# Patient Record
Sex: FEMALE
[De-identification: names, ages, dates, MRNs, and addresses within clinical notes are randomized per-mention and may not be internally consistent; named-entity substitution may affect disease eponyms.]

---

## 2018-11-08 ENCOUNTER — LAB SERVICES (OUTPATIENT)
Dept: OTHER | Age: 33
End: 2018-11-08

## 2018-11-08 ENCOUNTER — CHARTING TRANS (OUTPATIENT)
Dept: OTHER | Age: 33
End: 2018-11-08

## 2018-11-08 LAB — RAPID STREP GROUP A: NORMAL

## 2018-12-08 VITALS
DIASTOLIC BLOOD PRESSURE: 72 MMHG | TEMPERATURE: 98.6 F | BODY MASS INDEX: 19.77 KG/M2 | HEART RATE: 83 BPM | SYSTOLIC BLOOD PRESSURE: 110 MMHG | OXYGEN SATURATION: 99 % | WEIGHT: 123.02 LBS | HEIGHT: 66 IN

## 2024-03-13 ENCOUNTER — HOSPITAL ENCOUNTER (OUTPATIENT)
Age: 39
Discharge: HOME | End: 2024-03-13
Payer: COMMERCIAL

## 2024-03-13 VITALS
TEMPERATURE: 99 F | SYSTOLIC BLOOD PRESSURE: 148 MMHG | HEART RATE: 86 BPM | RESPIRATION RATE: 20 BRPM | OXYGEN SATURATION: 99 % | DIASTOLIC BLOOD PRESSURE: 71 MMHG

## 2024-03-13 DIAGNOSIS — H92.03 EAR PAIN, BILATERAL: Primary | ICD-10-CM

## 2024-03-13 NOTE — ED INITIAL ASSESSMENT (HPI)
Pt here with complaints of bilateral ear pain  that began today pt states she had ear buds on at the gym today because the music was to loud and states she has some ringing in her ears now, pt denies any fever

## 2024-03-14 ENCOUNTER — OFFICE VISIT (OUTPATIENT)
Dept: OTOLARYNGOLOGY | Facility: CLINIC | Age: 39
End: 2024-03-14

## 2024-03-14 VITALS — HEIGHT: 66 IN | BODY MASS INDEX: 19.29 KG/M2 | WEIGHT: 120 LBS

## 2024-03-14 DIAGNOSIS — H93.11 TINNITUS OF RIGHT EAR: Primary | ICD-10-CM

## 2024-03-14 DIAGNOSIS — H91.93 BILATERAL HEARING LOSS, UNSPECIFIED HEARING LOSS TYPE: ICD-10-CM

## 2024-03-14 DIAGNOSIS — H69.93 DYSFUNCTION OF BOTH EUSTACHIAN TUBES: ICD-10-CM

## 2024-03-14 PROCEDURE — 92504 EAR MICROSCOPY EXAMINATION: CPT | Performed by: STUDENT IN AN ORGANIZED HEALTH CARE EDUCATION/TRAINING PROGRAM

## 2024-03-14 PROCEDURE — 99203 OFFICE O/P NEW LOW 30 MIN: CPT | Performed by: STUDENT IN AN ORGANIZED HEALTH CARE EDUCATION/TRAINING PROGRAM

## 2024-03-14 PROCEDURE — 3008F BODY MASS INDEX DOCD: CPT | Performed by: STUDENT IN AN ORGANIZED HEALTH CARE EDUCATION/TRAINING PROGRAM

## 2024-03-14 RX ORDER — FLUTICASONE PROPIONATE 50 MCG
1 SPRAY, SUSPENSION (ML) NASAL 2 TIMES DAILY
Qty: 16 G | Refills: 3 | Status: SHIPPED | OUTPATIENT
Start: 2024-03-14

## 2024-03-14 RX ORDER — ADAPALENE GEL USP, 0.3% 3 MG/G
1 GEL TOPICAL NIGHTLY
COMMUNITY
Start: 2024-03-03

## 2024-03-14 RX ORDER — PREDNISONE 20 MG/1
20 TABLET ORAL DAILY
Qty: 7 TABLET | Refills: 0 | Status: SHIPPED | OUTPATIENT
Start: 2024-03-14 | End: 2024-03-21

## 2024-03-14 RX ORDER — CLINDAMYCIN AND BENZOYL PEROXIDE 10; 50 MG/G; MG/G
GEL TOPICAL
COMMUNITY
Start: 2023-10-03

## 2024-03-14 NOTE — ED PROVIDER NOTES
Patient Seen in: [facility]      History     Chief Complaint   Patient presents with    Ear Problem Pain     Stated Complaint: EAR ACHE    Subjective:   HPI  Patient is a 38-year-old female who presents to the immediate care center with concern for bilateral ear pain and tinnitus.  This evening she went to a workout facility where she found they were playing excessively loud music.  This caused her distress and prompted her to place earplugs.  She estimates she was exposed to the extreme loud noise for very short duration.  After working out, she noticed she had muffled, ringing sensation in her ears.  She denies headache or dizziness; denies recent illness.          Objective:   History reviewed. No pertinent past medical history.           History reviewed. No pertinent surgical history.             Social History     Socioeconomic History    Marital status:    Tobacco Use    Smoking status: Never    Smokeless tobacco: Never   Vaping Use    Vaping Use: Never used   Substance and Sexual Activity    Alcohol use: Never    Drug use: Never              Review of Systems   Constitutional:  Negative for chills and fever.   HENT:  Positive for ear pain and tinnitus.    Skin:  Negative for rash.   Neurological:  Negative for dizziness, weakness and headaches.       Positive for stated complaint: EAR ACHE  Other systems are as noted in HPI.  Constitutional and vital signs reviewed.      All other systems reviewed and negative except as noted above.    Physical Exam     ED Triage Vitals [03/13/24 1834]   /71   Pulse 86   Resp 20   Temp 98.9 °F (37.2 °C)   Temp src Temporal   SpO2 99 %   O2 Device None (Room air)       Current:/71   Pulse 86   Temp 98.9 °F (37.2 °C) (Temporal)   Resp 20   LMP 02/14/2024   SpO2 99%         Physical Exam  Vitals and nursing note reviewed.   Constitutional:       General: She is not in acute distress.  HENT:      Right Ear: Tympanic membrane, ear canal and  external ear normal.      Left Ear: Tympanic membrane, ear canal and external ear normal.      Nose: Nose normal.   Eyes:      Conjunctiva/sclera: Conjunctivae normal.   Pulmonary:      Effort: Pulmonary effort is normal. No respiratory distress.   Musculoskeletal:      Cervical back: Normal range of motion and neck supple.   Skin:     General: Skin is warm and dry.   Neurological:      Mental Status: She is alert and oriented to person, place, and time.   Psychiatric:         Behavior: Behavior normal.               ED Course   Labs Reviewed - No data to display                   MDM      Patient was reassured by negative findings on her physical examination today.  She was advised that structurally her ears show no signs of infection or injury.  As she does have some continued ringing in her ear, she was advised to follow-up with ENT specialist as she may need audiology testing or further evaluation.  She states understanding agrees with plan.                                 Medical Decision Making  Differential diagnoses considered include, but are not exclusive of: Acute otitis media, suppurative; acute otitis externa; acute otitis media, serous; ruptured tympanic membrane; mastoiditis.      Problems Addressed:  Ear pain, bilateral: undiagnosed new problem with uncertain prognosis    Risk  OTC drugs.        Disposition and Plan     Clinical Impression:  1. Ear pain, bilateral         Disposition:  Discharge  3/13/2024  7:28 pm    Follow-up:  Satya Frye DO  [address] [address]  [phone]    Schedule an appointment as soon as possible for a visit in 1 week      [facility] Emergency Department  [address] [facility] [address]  [phone]  Go to   If symptoms worsen          Medications Prescribed:  There are no discharge medications for this patient.

## 2024-03-14 NOTE — PROGRESS NOTES
[place]  OTOLARYNGOLOGY - HEAD & NECK SURGERY    3/14/2024     Reason for Consultation:   Bilateral ear problem    History of Present Illness:   Patient is a pleasant 38 year old female who is being seen for an ear issue which she describes as pressure behind her ear, popping and crackling, and heightened sensitivity to noise.  She noticed this first happening after a workout class yesterday where the music was being played very loudly.  She did wear inserts in her ear but she stated afterwards she felt as though she had discomfort about noise.  She was concerned that she may have developed hearing loss due to loud noise exposure due to this.  She is here for further evaluation as she continues to have this popping and crackling sensation and pressure as well as tinnitus which is mostly in the right ear.    Past Medical History  History reviewed. No pertinent past medical history.    Past Surgical History  History reviewed. No pertinent surgical history.    Family History  History reviewed. No pertinent family history.    Social History  Pediatric History   Patient Parents    Not on file     Other Topics Concern    Not on file   Social History Narrative    Not on file           Current Medications:  Current Outpatient Medications   Medication Sig Dispense Refill    Adapalene 0.3 % External Gel Apply 1 Application topically nightly. (Patient not taking: Reported on 3/14/2024)      Clindamycin Phos-Benzoyl Perox 1-5 % External Gel APPLY THIN LAYER TOPICALLY TO FACE EVERY DAY (Patient not taking: Reported on 3/14/2024)         Allergies  Allergies   Allergen Reactions    Nuts OTHER (SEE COMMENTS) and ITCHING     Cerner Allergy Text Annotation: Nuts       Review of Systems:   A comprehensive 10 point review of systems was completed.  Pertinent positives and negatives noted in the the HPI.    Physical Exam:   Height 5' 6\" (1.676 m), weight 120 lb (54.4 kg), last menstrual period 02/14/2024.    GENERAL: No acute  distress, Comfortable appearing  FACE: HB 1/6, Normal Animation  HEAD: Normocephalic  EYES: EOMI, pupils equil  EARS: Bilateral Auricles Symmetric  NOSE: Nares patent bilaterally  ORAL CAVITY: Tongue mobile, Oropharynx clear, Floor of mouth clear, Posterior oropharynx normal  NECK: No palpable lymphadenopathy, thyroid not palpable, nontender    Canals:  Right: Clear  Left: Clear    Tympanic Membranes:  Right: Normal tympanic membrane, with no retraction, middle ear space clear  Left: Normal tympanic membrane. with no retraction middle ear space clear    TM Visualized Method:   Right TM examined via otomicroscopy.   Left TM examined via otomicroscopy.        Results:     Laboratory Data:  No results found for: \"WBC\", \"HGB\", \"HCT\", \"PLT\", \"CREATSERUM\", \"BUN\", \"NA\", \"K\", \"CL\", \"CO2\", \"GLU\", \"CA\", \"ALB\", \"ALKPHO\", \"TP\", \"AST\", \"ALT\", \"PTT\", \"INR\", \"PTP\", \"T4F\", \"TSH\", \"TSHREFLEX\", \"MARIBETH\", \"LIP\", \"GGT\", \"PSA\", \"DDIMER\", \"ESRML\", \"ESRPF\", \"CRP\", \"BNP\", \"MG\", \"PHOS\", \"TROP\", \"CK\", \"CKMB\", \"MEL\", \"RPR\", \"B12\", \"ETOH\", \"POCGLU\"      Imaging:  No results found.      Impression:   Bilateral eustachian tube dysfunction  Bilateral hyperacusis  Bilateral hearing loss, unspecified  Tinnitus    Recommendations:  I will give her 1 week of 20mg Prednisone  She will use Flonase twice daily for 2 weeks  I will have her see me back in 4 weeks at [facility] to obtain audiogram if she has persistent symptoms.  She may also have a component of TMJ    Thank you for allowing me to participate in the care of your patient.    Satya Frye,    Otolaryngology/Rhinology, Sinus, and Endoscopic Skull Base Surgery  [address] [place] [address]  Phone [phone]  Fax [phone]  3/14/2024  11:30 AM  3/14/2024

## 2024-03-16 ENCOUNTER — PATIENT MESSAGE (OUTPATIENT)
Dept: OTOLARYNGOLOGY | Facility: CLINIC | Age: 39
End: 2024-03-16

## 2024-03-18 ENCOUNTER — TELEPHONE (OUTPATIENT)
Dept: OTOLARYNGOLOGY | Facility: CLINIC | Age: 39
End: 2024-03-18

## 2024-03-18 DIAGNOSIS — H69.93 DYSFUNCTION OF BOTH EUSTACHIAN TUBES: ICD-10-CM

## 2024-03-18 DIAGNOSIS — H93.11 TINNITUS OF RIGHT EAR: Primary | ICD-10-CM

## 2024-03-18 DIAGNOSIS — H91.93 BILATERAL HEARING LOSS, UNSPECIFIED HEARING LOSS TYPE: ICD-10-CM

## 2024-03-18 RX ORDER — MELOXICAM 15 MG/1
15 TABLET ORAL NIGHTLY
Qty: 30 TABLET | Refills: 3 | Status: SHIPPED | OUTPATIENT
Start: 2024-03-18

## 2024-03-18 NOTE — TELEPHONE ENCOUNTER
Spoke to the patient, I will send her Meloxicam and she states she made an appointment to see Dr. Potter tomorrow and will likely get audiogram. I told her that is a good idea and she will be evaluated by him tomorrow.

## 2024-03-18 NOTE — TELEPHONE ENCOUNTER
Pt message 3-16-24.  Pt called stating pt had an appointment on 3-14-24.  Some symptoms are getting better.  Still has pain in the right ear. Question.  Is it ok to travel by airplane on Wednesday 3-20-24.   Please call pt

## 2024-03-18 NOTE — TELEPHONE ENCOUNTER
Per dr. Uday md Spoke to the patient, md will send her Meloxicam and she states she made an appointment to see Dr. Potter tomorrow and will likely get audiogram. md told her that is a good idea and she will be evaluated by him tomorrow.   Future Appointments   Date Time Provider Department Center   3/19/2024  8:10 AM Darren Potter MD [facility]   4/15/2024  2:15 PM Satya Frye DO [facility]

## 2024-03-18 NOTE — TELEPHONE ENCOUNTER
It is okay to fly as I did not see any fluid in the middle ears bilaterally. I would just continue using Flonase through the trip to help prevent any issues!    Best,     MH

## 2024-03-18 NOTE — TELEPHONE ENCOUNTER
Contacted patient and per Dr. Frye, It is okay to fly as MD did not see any fluid in the middle ears bilaterally. Patient should just continue using Flonase through the trip to help prevent any issues!    Patient now states that yesterday, she felt like there was fluid in her right ear all day but it cleared last night, right ear feels damp but no drainage noted, and sounds to right ear sound amplified. Would like further advice concerning these issues.    Dr. Frye, please see message and advise.

## 2024-03-19 ENCOUNTER — OFFICE VISIT (OUTPATIENT)
Dept: OTOLARYNGOLOGY | Facility: CLINIC | Age: 39
End: 2024-03-19
Payer: COMMERCIAL

## 2024-03-19 DIAGNOSIS — M26.609 TMJ (TEMPOROMANDIBULAR JOINT DISORDER): ICD-10-CM

## 2024-03-19 DIAGNOSIS — H93.8X1 SENSATION OF FULLNESS IN RIGHT EAR: Primary | ICD-10-CM

## 2024-03-19 PROCEDURE — 92504 EAR MICROSCOPY EXAMINATION: CPT | Performed by: STUDENT IN AN ORGANIZED HEALTH CARE EDUCATION/TRAINING PROGRAM

## 2024-03-19 PROCEDURE — 99213 OFFICE O/P EST LOW 20 MIN: CPT | Performed by: STUDENT IN AN ORGANIZED HEALTH CARE EDUCATION/TRAINING PROGRAM

## 2024-03-19 PROCEDURE — 92567 TYMPANOMETRY: CPT | Performed by: STUDENT IN AN ORGANIZED HEALTH CARE EDUCATION/TRAINING PROGRAM

## 2024-03-19 RX ORDER — MELOXICAM 15 MG/1
15 TABLET ORAL NIGHTLY
Qty: 14 TABLET | Refills: 0 | Status: SHIPPED | OUTPATIENT
Start: 2024-03-19 | End: 2024-04-02

## 2024-03-19 NOTE — PROGRESS NOTES
Elizabeth Marklein is a 38 year old female.   Chief Complaint   Patient presents with    Ear Problem     C/o right ear sensitivity, pt seen dr. Frye and is traveling        ASSESSMENT AND PLAN:   1. Sensation of fullness in right ear  38-year-old presents in follow-up regarding sensitivity of her right ear.  She is going to be flying to [place] tomorrow.  She does have a recent history of dental work In need of implants.  She does possibly grind her teeth.    Exam she has scalloping of her tongue and missing dentition.  She had a normal ear exam and type a tympanograms on each side    Reassured her of the normal ear exam and very normal tympanograms with no signs of eustachian tube dysfunction.  Discussed that given her dental history and scalloping of her tongue she may have a flare of a TMJ issue.  Discussed that sometimes these can flareup with flying due to clenching during the flight.  Will prescribe a short course of meloxicam and discussed TMJ precautions.    - Audiology Exam    2. TMJ (temporomandibular joint disorder)        The patient indicates understanding of these issues and agrees to the plan.      EXAM:   [facility] 02/14/2024     Pertinent exam findings may also be noted above in assessment and plan     System Details   Skin Inspection - Normal.   Constitutional Overall appearance - Normal.   Head/Face Symmetric, TMJ tenderness not present    Eyes EOMI, PERRL   Right ear:  Canal clear, TM intact, no NICOLE   Left ear:  Canal clear, TM intact, no NICOLE   Nose: Septum midline, inferior turbinates not enlarged, nasal valves without collapse    Oral cavity/Oropharynx: No lesions or masses on inspection or palpation, tonsils symmetric    Neck: Soft without LAD, thyroid not enlarged  Voice clear/ no stridor   Other:      Scopes and Procedures:             Current Outpatient Medications   Medication Sig Dispense Refill    Meloxicam 15 MG Oral Tab Take 1 tablet (15 mg total) by mouth at bedtime. 30 tablet 3     predniSONE 20 MG Oral Tab Take 1 tablet (20 mg total) by mouth daily for 7 days. 7 tablet 0    fluticasone propionate 50 MCG/ACT Nasal Suspension 1 spray by Nasal route 2 (two) times daily. 16 g 3    Adapalene 0.3 % External Gel Apply 1 Application topically nightly. (Patient not taking: Reported on 3/14/2024)      Clindamycin Phos-Benzoyl Perox 1-5 % External Gel APPLY THIN LAYER TOPICALLY TO FACE EVERY DAY (Patient not taking: Reported on 3/14/2024)        History reviewed. No pertinent past medical history.   Social History:  Social History     Socioeconomic History    Marital status:    Tobacco Use    Smoking status: Never    Smokeless tobacco: Never   Vaping Use    Vaping Use: Never used   Substance and Sexual Activity    Alcohol use: Never    Drug use: Never          Darren Potter MD  3/19/2024  8:49 AM

## 2024-03-20 NOTE — TELEPHONE ENCOUNTER
From: Elizabeth Marklein  To: Satya Frye  Sent: 3/16/2024 7:05 AM CDT  Subject: Travel by airplane    Hi Dr. Frye,  I forgot to mention that I am supposed to travel with my kids to [place] on Wednesday. Is this something that I can do with Eustachian tube dysfunction? My left ear is feeling better but my right ear continues to have some pain/crackling. Is there anything I should do preventatively before flying?    Thank you  Elizabeth Marklein

## 2024-04-15 ENCOUNTER — OFFICE VISIT (OUTPATIENT)
Dept: OTOLARYNGOLOGY | Facility: CLINIC | Age: 39
End: 2024-04-15
Payer: COMMERCIAL

## 2024-04-15 VITALS — HEIGHT: 65 IN | BODY MASS INDEX: 19.99 KG/M2 | WEIGHT: 120 LBS

## 2024-04-15 DIAGNOSIS — H69.93 DYSFUNCTION OF BOTH EUSTACHIAN TUBES: ICD-10-CM

## 2024-04-15 DIAGNOSIS — H93.11 TINNITUS OF RIGHT EAR: ICD-10-CM

## 2024-04-15 DIAGNOSIS — M26.609 TMJ (TEMPOROMANDIBULAR JOINT DISORDER): ICD-10-CM

## 2024-04-15 DIAGNOSIS — H93.8X1 SENSATION OF FULLNESS IN RIGHT EAR: Primary | ICD-10-CM

## 2024-04-15 PROCEDURE — 3008F BODY MASS INDEX DOCD: CPT | Performed by: STUDENT IN AN ORGANIZED HEALTH CARE EDUCATION/TRAINING PROGRAM

## 2024-04-15 PROCEDURE — 31231 NASAL ENDOSCOPY DX: CPT | Performed by: STUDENT IN AN ORGANIZED HEALTH CARE EDUCATION/TRAINING PROGRAM

## 2024-04-15 PROCEDURE — 99213 OFFICE O/P EST LOW 20 MIN: CPT | Performed by: STUDENT IN AN ORGANIZED HEALTH CARE EDUCATION/TRAINING PROGRAM

## 2024-04-15 RX ORDER — LORAZEPAM 1 MG/1
1 TABLET ORAL 2 TIMES DAILY PRN
COMMUNITY
Start: 2024-03-14

## 2024-04-15 NOTE — PROGRESS NOTES
[facility]  OTOLARYNGOLOGY - HEAD & NECK SURGERY    4/15/2024     Reason for Consultation:   Bilateral ear problem    History of Present Illness:   Patient is a pleasant 38 year old female who is being seen for an ear issue which she describes as pressure behind her ear, popping and crackling, and heightened sensitivity to noise.  She noticed this first happening after a workout class yesterday where the music was being played very loudly.  She did wear inserts in her ear but she stated afterwards she felt as though she had discomfort about noise.  She was concerned that she may have developed hearing loss due to loud noise exposure due to this.  She is here for further evaluation as she continues to have this popping and crackling sensation and pressure as well as tinnitus which is mostly in the right ear.    INTERVAL HISTORY  4/15/2024: Patient does have some improvement. She is here to check her jaw and follow up on her symptoms    Past Medical History  History reviewed. No pertinent past medical history.    Past Surgical History  Past Surgical History:   Procedure Laterality Date    Other surgical history  02/14/2024    oral surgery       Family History  History reviewed. No pertinent family history.    Social History  Pediatric History   Patient Parents    Not on file     Other Topics Concern    Not on file   Social History Narrative    Not on file           Current Medications:  Current Outpatient Medications   Medication Sig Dispense Refill    LORazepam 1 MG Oral Tab Take 1 tablet (1 mg total) by mouth 2 (two) times daily as needed for Anxiety.      tretinoin 0.025 % External Cream Apply pea-sized amount to facial skin every other night to nightly, as tolerated      Meloxicam 15 MG Oral Tab Take 1 tablet (15 mg total) by mouth at bedtime. 30 tablet 3    fluticasone propionate 50 MCG/ACT Nasal Suspension 1 spray by Nasal route 2 (two) times daily. 16 g 3    Adapalene 0.3 % External Gel Apply 1 Application  topically nightly. (Patient not taking: Reported on 3/14/2024)      Clindamycin Phos-Benzoyl Perox 1-5 % External Gel APPLY THIN LAYER TOPICALLY TO FACE EVERY DAY (Patient not taking: Reported on 3/14/2024)         Allergies  Allergies   Allergen Reactions    Nuts OTHER (SEE COMMENTS) and ITCHING     Cerner Allergy Text Annotation: Nuts       Review of Systems:   A comprehensive 10 point review of systems was completed.  Pertinent positives and negatives noted in the the HPI.    Physical Exam:   Height 5' 5\" (1.651 m), weight 120 lb (54.4 kg), last menstrual period 02/14/2024.    GENERAL: No acute distress, Comfortable appearing  FACE: HB 1/6, Normal Animation  HEAD: Normocephalic  EYES: EOMI, pupils equil  EARS: Bilateral Auricles Symmetric  NOSE: Nares patent bilaterally  ORAL CAVITY: Tongue mobile, Oropharynx clear, Floor of mouth clear, Posterior oropharynx normal  NECK: No palpable lymphadenopathy, thyroid not palpable, nontender    Canals:  Right: Clear  Left: Clear    Tympanic Membranes:  Right: Normal tympanic membrane, with no retraction, middle ear space clear  Left: Normal tympanic membrane. with no retraction middle ear space clear    TM Visualized Method:   Right TM examined via otomicroscopy.   Left TM examined via otomicroscopy.      PROCEDURE: BILATERAL RIGID NASAL ENDOSCOPY  Bilateral rigid nasal endoscopy (06874) was performed. Verbal consent was obtained from the patient to proceed with rigid nasal endoscopy. The nasal cavity was decongested and topically anesthetized with a combination of Oxymetazoline and 4% Lidocaine. A rigid 4mm 30 degree nasal endoscope connected to a high-definition endoscopy system was used to examine both nasal cavities. Digital photos and/or videos of relevant exam findings were obtained. The inferior meatus, inferior turbinate, nasopharynx, middle meatus, middle turbinate, superior meatus, superior turbinate, and sphenoethmoidal recess were examined bilaterally and  deemed to be normal, with any exceptions as noted below. At the completion of the procedure the endoscope was removed. The patient tolerated the procedure well. There were no complications.    Findings: The bilateral inferior turbinates were normal. The Septum was deviated to the right with bony spur. The middle meatus was patent bilaterally. There were no obvious masses or polyps noted.        Results:     Laboratory Data:  No results found for: \"WBC\", \"HGB\", \"HCT\", \"PLT\", \"CREATSERUM\", \"BUN\", \"NA\", \"K\", \"CL\", \"CO2\", \"GLU\", \"CA\", \"ALB\", \"ALKPHO\", \"TP\", \"AST\", \"ALT\", \"PTT\", \"INR\", \"PTP\", \"T4F\", \"TSH\", \"TSHREFLEX\", \"MARIBETH\", \"LIP\", \"GGT\", \"PSA\", \"DDIMER\", \"ESRML\", \"ESRPF\", \"CRP\", \"BNP\", \"MG\", \"PHOS\", \"TROP\", \"CK\", \"CKMB\", \"MEL\", \"RPR\", \"B12\", \"ETOH\", \"POCGLU\"      Imaging:  No results found.      Impression:   Bilateral eustachian tube dysfunction  Bilateral hyperacusis  Bilateral hearing loss, unspecified  Tinnitus  Deviated nasal septum    Recommendations:  Patient is improving and no longer has loud tinnitus throughout the day. She still has popping and crackling in the ear. I would like to restart her on Flonase. We can consider a trial of sudafed as well. She will likely continue to improve    Thank you for allowing me to participate in the care of your patient.    Satya Frye,    Otolaryngology/Rhinology, Sinus, and Endoscopic Skull Base Surgery  [address] [place] [address]  Phone [phone]  Fax [phone]  3/14/2024  11:30 AM  4/15/2024

## 2024-12-27 ENCOUNTER — HOSPITAL ENCOUNTER (OUTPATIENT)
Age: 39
Discharge: HOME | End: 2024-12-27
Payer: COMMERCIAL

## 2024-12-27 VITALS
SYSTOLIC BLOOD PRESSURE: 106 MMHG | OXYGEN SATURATION: 100 % | RESPIRATION RATE: 20 BRPM | TEMPERATURE: 98 F | HEART RATE: 90 BPM | DIASTOLIC BLOOD PRESSURE: 85 MMHG

## 2024-12-27 DIAGNOSIS — J11.1 INFLUENZA: Primary | ICD-10-CM

## 2024-12-27 DIAGNOSIS — R05.9 COUGH: ICD-10-CM

## 2024-12-27 LAB
POCT INFLUENZA A: POSITIVE
POCT INFLUENZA B: NEGATIVE
S PYO AG THROAT QL: NEGATIVE

## 2024-12-27 RX ORDER — BENZONATATE 200 MG/1
200 CAPSULE ORAL 3 TIMES DAILY PRN
Qty: 15 CAPSULE | Refills: 0 | Status: SHIPPED | OUTPATIENT
Start: 2024-12-27

## 2024-12-27 NOTE — ED PROVIDER NOTES
Patient Seen in: [facility]      History     Chief Complaint   Patient presents with    Cough/URI     Stated Complaint: cold symptoms    Subjective:   HPI  Patient is an 39-year-old female that presents to the immediate care center today with concern for cough, congestion, sore throat, ear pain that started 4 days ago. Pt's daughter recently had similar symptoms.  Pt. has been eating and drinking without difficulty.  She has had no shortness of air; no abdominal or back pain; no headache or dizziness.          Objective:     History reviewed. No pertinent past medical history.           Past Surgical History:   Procedure Laterality Date    Other surgical history  02/14/2024    oral surgery                Social History     Socioeconomic History    Marital status:    Tobacco Use    Smoking status: Never    Smokeless tobacco: Never   Vaping Use    Vaping status: Never Used   Substance and Sexual Activity    Alcohol use: Never    Drug use: Never     Social Drivers of Health      [facility], [facility]    Housing Stability              Review of Systems   Constitutional:  Positive for fatigue. Negative for appetite change, chills and fever.   HENT:  Positive for congestion, ear pain and sore throat.    Respiratory:  Positive for cough.    Gastrointestinal:  Positive for diarrhea. Negative for nausea and vomiting.   Musculoskeletal:  Negative for arthralgias and myalgias.   Skin:  Negative for rash.   Neurological:  Negative for dizziness.       Positive for stated complaint: cold symptoms  Other systems are as noted in HPI.  Constitutional and vital signs reviewed.      All other systems reviewed and negative except as noted above.    Physical Exam     ED Triage Vitals [12/27/24 0934]   /85   Pulse 90   Resp 20   Temp 98.1 °F (36.7 °C)   Temp src Oral   SpO2 100 %   O2 Device None (Room air)       Current Vitals:   Vital Signs  BP:  106/85  Pulse: 90  Resp: 20  Temp: 98.1 °F (36.7 °C)  Temp src: Oral    Oxygen Therapy  SpO2: 100 %  O2 Device: None (Room air)        Physical Exam  Vitals and nursing note reviewed.   Constitutional:       General: She is not in acute distress.     Appearance: She is not ill-appearing.   HENT:      Right Ear: Tympanic membrane and ear canal normal.      Left Ear: Tympanic membrane and ear canal normal.      Nose: Nose normal.   Eyes:      Conjunctiva/sclera: Conjunctivae normal.   Pulmonary:      Effort: Pulmonary effort is normal. No respiratory distress.      Breath sounds: Normal breath sounds.   Musculoskeletal:      Cervical back: Normal range of motion and neck supple.   Skin:     General: Skin is warm and dry.      Findings: No rash.   Neurological:      Mental Status: She is alert and oriented to person, place, and time.             ED Course     Labs Reviewed   POCT FLU TEST - Abnormal; Notable for the following components:       Result Value    POCT INFLUENZA A Positive (*)     All other components within normal limits    Narrative:     This assay is a rapid molecular in vitro test utilizing nucleic acid amplification of influenza A and B viral RNA.   POCT RAPID STREP - Normal                   MDM              Medical Decision Making  Differential diagnoses considered included, but are not exclusive of: bacterial vs viral sinusitis, dehydration, pneumonia, influenza, Covid-19 infection, and other viral upper respiratory infection.     Differential diagnoses considered include, but are not exclusive of: Acute otitis media, suppurative; acute otitis externa; acute otitis media, serous; ruptured tympanic membrane; mastoiditis.      Problems Addressed:  Influenza: self-limited or minor problem    Amount and/or Complexity of Data Reviewed  Labs:  Decision-making details documented in ED Course.    Risk  OTC drugs.  Prescription drug management.        Disposition and Plan     Clinical Impression:  1.  Influenza    2. Cough         Disposition:  Discharge  12/27/2024 10:12 am    Follow-up:  Cony Kay MD  [facility] [address] [address] [phone]      As needed          Medications Prescribed:  Current Discharge Medication List        START taking these medications    Details   benzonatate 200 MG Oral Cap Take 1 capsule (200 mg total) by mouth 3 (three) times daily as needed for cough.  Qty: 15 capsule, Refills: 0                 Supplementary Documentation:

## 2024-12-27 NOTE — ED INITIAL ASSESSMENT (HPI)
Pt states 4 days began having symptoms, pt states having fevers, sore throat, cough, runny nose, loss of appetite, diarrhea, and right ear pain. Pt denies vomiting.